# Patient Record
Sex: MALE | Race: BLACK OR AFRICAN AMERICAN | Employment: OTHER | URBAN - METROPOLITAN AREA
[De-identification: names, ages, dates, MRNs, and addresses within clinical notes are randomized per-mention and may not be internally consistent; named-entity substitution may affect disease eponyms.]

---

## 2018-06-18 PROBLEM — R93.1 ABNORMAL ECHOCARDIOGRAM: Status: ACTIVE | Noted: 2018-06-18

## 2018-06-18 PROBLEM — Z79.01 ENCOUNTER FOR CURRENT LONG-TERM USE OF ANTICOAGULANTS: Status: ACTIVE | Noted: 2018-06-18

## 2018-12-17 PROBLEM — I25.810 CORONARY ARTERY DISEASE INVOLVING CORONARY BYPASS GRAFT OF NATIVE HEART WITHOUT ANGINA PECTORIS: Chronic | Status: ACTIVE | Noted: 2018-12-17

## 2019-08-14 ENCOUNTER — HOSPITAL ENCOUNTER (OUTPATIENT)
Age: 69
Discharge: HOME | End: 2019-08-14
Payer: MEDICARE

## 2019-08-14 LAB — RUBV IGG SER QL: 88 IU/ML

## 2019-08-14 PROCEDURE — 86735 MUMPS ANTIBODY: CPT

## 2019-08-14 PROCEDURE — 86762 RUBELLA ANTIBODY: CPT

## 2019-08-14 PROCEDURE — 86481 TB AG RESPONSE T-CELL SUSP: CPT

## 2019-08-14 PROCEDURE — 86787 VARICELLA-ZOSTER ANTIBODY: CPT

## 2019-08-14 PROCEDURE — 86765 RUBEOLA ANTIBODY: CPT

## 2019-08-16 LAB
MEASLES IMMUNE (IGG): 3.86
MUV IGG SER QL: 5.48
VZV IGG SER QL IA: 3.79

## 2019-08-17 LAB — T-SPOT TB TEST: NORMAL

## 2019-10-05 ENCOUNTER — OFFICE VISIT (OUTPATIENT)
Dept: PRIMARY CARE CLINIC | Age: 69
End: 2019-10-05
Payer: MEDICARE

## 2019-10-05 VITALS
TEMPERATURE: 97.2 F | WEIGHT: 198.2 LBS | BODY MASS INDEX: 24.64 KG/M2 | DIASTOLIC BLOOD PRESSURE: 80 MMHG | HEART RATE: 69 BPM | HEIGHT: 75 IN | SYSTOLIC BLOOD PRESSURE: 131 MMHG

## 2019-10-05 DIAGNOSIS — S69.92XA INJURY OF LEFT THUMB, INITIAL ENCOUNTER: Primary | ICD-10-CM

## 2019-10-05 PROCEDURE — G8484 FLU IMMUNIZE NO ADMIN: HCPCS | Performed by: NURSE PRACTITIONER

## 2019-10-05 PROCEDURE — 3017F COLORECTAL CA SCREEN DOC REV: CPT | Performed by: NURSE PRACTITIONER

## 2019-10-05 PROCEDURE — 4040F PNEUMOC VAC/ADMIN/RCVD: CPT | Performed by: NURSE PRACTITIONER

## 2019-10-05 PROCEDURE — 1123F ACP DISCUSS/DSCN MKR DOCD: CPT | Performed by: NURSE PRACTITIONER

## 2019-10-05 PROCEDURE — G8427 DOCREV CUR MEDS BY ELIG CLIN: HCPCS | Performed by: NURSE PRACTITIONER

## 2019-10-05 PROCEDURE — G8420 CALC BMI NORM PARAMETERS: HCPCS | Performed by: NURSE PRACTITIONER

## 2019-10-05 PROCEDURE — G8599 NO ASA/ANTIPLAT THER USE RNG: HCPCS | Performed by: NURSE PRACTITIONER

## 2019-10-05 PROCEDURE — 99202 OFFICE O/P NEW SF 15 MIN: CPT | Performed by: NURSE PRACTITIONER

## 2019-10-05 PROCEDURE — 1036F TOBACCO NON-USER: CPT | Performed by: NURSE PRACTITIONER

## 2019-10-05 RX ORDER — CEPHALEXIN 500 MG/1
500 CAPSULE ORAL 3 TIMES DAILY
Qty: 21 CAPSULE | Refills: 0 | Status: SHIPPED | OUTPATIENT
Start: 2019-10-05 | End: 2019-10-12

## 2019-10-05 ASSESSMENT — ENCOUNTER SYMPTOMS
ABDOMINAL PAIN: 0
NAUSEA: 0
VOMITING: 0
SORE THROAT: 0
EYE PAIN: 0
SINUS PAIN: 0
BACK PAIN: 0
COUGH: 0
DIARRHEA: 0
SHORTNESS OF BREATH: 0

## 2019-10-05 ASSESSMENT — PATIENT HEALTH QUESTIONNAIRE - PHQ9
2. FEELING DOWN, DEPRESSED OR HOPELESS: 0
SUM OF ALL RESPONSES TO PHQ9 QUESTIONS 1 & 2: 0
SUM OF ALL RESPONSES TO PHQ QUESTIONS 1-9: 0
SUM OF ALL RESPONSES TO PHQ QUESTIONS 1-9: 0
1. LITTLE INTEREST OR PLEASURE IN DOING THINGS: 0

## 2021-01-24 ENCOUNTER — HOSPITAL ENCOUNTER (EMERGENCY)
Age: 71
Discharge: HOME | End: 2021-01-24
Attending: EMERGENCY MEDICINE
Payer: MEDICARE

## 2021-01-24 VITALS
RESPIRATION RATE: 16 BRPM | OXYGEN SATURATION: 100 % | SYSTOLIC BLOOD PRESSURE: 171 MMHG | TEMPERATURE: 98.1 F | HEART RATE: 63 BPM | DIASTOLIC BLOOD PRESSURE: 93 MMHG

## 2021-01-24 DIAGNOSIS — M62.838 TRAPEZIUS MUSCLE SPASM: Primary | ICD-10-CM

## 2021-01-24 PROCEDURE — 96372 THER/PROPH/DIAG INJ SC/IM: CPT

## 2021-01-24 PROCEDURE — 99283 EMERGENCY DEPT VISIT LOW MDM: CPT

## 2021-01-24 PROCEDURE — 6370000000 HC RX 637 (ALT 250 FOR IP): Performed by: STUDENT IN AN ORGANIZED HEALTH CARE EDUCATION/TRAINING PROGRAM

## 2021-01-24 PROCEDURE — 6360000002 HC RX W HCPCS: Performed by: STUDENT IN AN ORGANIZED HEALTH CARE EDUCATION/TRAINING PROGRAM

## 2021-01-24 RX ORDER — ACETAMINOPHEN 500 MG
500 TABLET ORAL ONCE
Status: COMPLETED | OUTPATIENT
Start: 2021-01-24 | End: 2021-01-24

## 2021-01-24 RX ORDER — LIDOCAINE 4 G/G
1 PATCH TOPICAL DAILY
Status: DISCONTINUED | OUTPATIENT
Start: 2021-01-24 | End: 2021-01-24

## 2021-01-24 RX ORDER — KETOROLAC TROMETHAMINE 30 MG/ML
30 INJECTION, SOLUTION INTRAMUSCULAR; INTRAVENOUS ONCE
Status: COMPLETED | OUTPATIENT
Start: 2021-01-24 | End: 2021-01-24

## 2021-01-24 RX ORDER — CYCLOBENZAPRINE HCL 10 MG
10 TABLET ORAL 3 TIMES DAILY PRN
Qty: 12 TABLET | Refills: 0 | Status: SHIPPED | OUTPATIENT
Start: 2021-01-24 | End: 2021-01-29

## 2021-01-24 RX ORDER — CYCLOBENZAPRINE HCL 10 MG
5 TABLET ORAL ONCE
Status: COMPLETED | OUTPATIENT
Start: 2021-01-24 | End: 2021-01-24

## 2021-01-24 RX ORDER — CYCLOBENZAPRINE HCL 10 MG
10 TABLET ORAL ONCE
Status: DISCONTINUED | OUTPATIENT
Start: 2021-01-24 | End: 2021-01-24

## 2021-01-24 RX ORDER — NAPROXEN 500 MG/1
500 TABLET ORAL 2 TIMES DAILY WITH MEALS
Qty: 10 TABLET | Refills: 0 | Status: SHIPPED | OUTPATIENT
Start: 2021-01-24 | End: 2022-10-18

## 2021-01-24 RX ORDER — LIDOCAINE 4 G/G
1 PATCH TOPICAL DAILY
Status: DISCONTINUED | OUTPATIENT
Start: 2021-01-24 | End: 2021-01-24 | Stop reason: HOSPADM

## 2021-01-24 RX ADMIN — KETOROLAC TROMETHAMINE 30 MG: 30 INJECTION, SOLUTION INTRAMUSCULAR at 04:06

## 2021-01-24 RX ADMIN — ACETAMINOPHEN 500 MG: 500 TABLET ORAL at 04:06

## 2021-01-24 RX ADMIN — CYCLOBENZAPRINE 5 MG: 10 TABLET, FILM COATED ORAL at 04:06

## 2021-01-24 ASSESSMENT — ENCOUNTER SYMPTOMS
VOMITING: 0
SHORTNESS OF BREATH: 0
ABDOMINAL PAIN: 0
NAUSEA: 0
BACK PAIN: 0

## 2021-01-24 ASSESSMENT — PAIN SCALES - GENERAL: PAINLEVEL_OUTOF10: 9

## 2021-01-24 ASSESSMENT — PAIN DESCRIPTION - LOCATION: LOCATION: NECK

## 2021-01-24 NOTE — ED PROVIDER NOTES
[facility]  ED  Emergency Department Encounter  Emergency Medicine Resident     Pt Name: Tree Burch  MRN: [[id number]]  [place] [id number]  Date of evaluation: 1/24/21  PCP:  No primary care provider on file. CHIEF COMPLAINT       Chief Complaint   Patient presents with    Neck Pain     No head pain, no fall, no numbnss/tingling       HISTORY OFPRESENT ILLNESS  (Location/Symptom, Timing/Onset, Context/Setting, Quality, Duration, Modifying Factors,Severity.)      Tree Burch is a 69 y. o.yo male who presents with neck pain. Patient here with bilateral trapezius pain started on Thursday progressively worsened, denies any traumatic injuries denies any numbness or tingling in his hands or feet, denies weakness on bilateral upper extremities. States he has no midline tenderness and is mostly his paraspinal area. Says he is unable to turn his neck. Denies headache or vision changes or focal neuro deficits. Denies fevers or chills at home, states he has never had a major infection does have a history of CAD and pacemaker in place. Denies nausea or vomiting, rash or shortness of breath or chest pain. PAST MEDICAL / SURGICAL / SOCIAL / FAMILY HISTORY      has a past medical history of Atrial fibrillation ([address] Coronary atherosclerosis of unspecified type of vessel, native or graft, Edema, Other and unspecified hyperlipidemia, Preoperative examination, unspecified, Shortness of breath, Type II or unspecified type diabetes mellitus without mention of complication, not stated as uncontrolled, and Unspecified essential hypertension. has a past surgical history that includes Coronary artery bypass graft (2005); Cardiac pacemaker placement (3/07 and 2/15 ); Achilles tendon surgery; Cholecystectomy, laparoscopic (5/15); and Bariatric Surgery (5/15 ).      Social History     Socioeconomic History    Marital status:      Spouse name: Not on file    Number of children: Not on file    Years of education: Not on file    Highest education level: Not on file   Occupational History    Not on file   Social Needs    Financial resource strain: Not on file    Food insecurity     Worry: Not on file     Inability: Not on file    Transportation needs     Medical: Not on file     Non-medical: Not on file   Tobacco Use    Smoking status: Never Smoker    Smokeless tobacco: Never Used   Substance and Sexual Activity    Alcohol use: Yes    Drug use: No    Sexual activity: Not on file   Lifestyle    Physical activity     Days per week: Not on file     Minutes per session: Not on file    Stress: Not on file   Relationships    Social connections     Talks on phone: Not on file     Gets together: Not on file     Attends Latter day service: Not on file     Active member of club or organization: Not on file     Attends meetings of clubs or organizations: Not on file     Relationship status: Not on file    Intimate partner violence     Fear of current or ex partner: Not on file     Emotionally abused: Not on file     Physically abused: Not on file     Forced sexual activity: Not on file   Other Topics Concern    Not on file   Social History Narrative    Not on file       Family History   Problem Relation Age of Onset    Heart Attack Father         Allergies:  Codeine    Home Medications:  Prior to Admission medications    Medication Sig Start Date End Date Taking?  Authorizing Provider   cyclobenzaprine (FLEXERIL) 10 MG tablet Take 1 tablet by mouth 3 times daily as needed for Muscle spasms 1/24/21 1/29/21 Yes Emanuel Breen MD   naproxen (NAPROSYN) 500 MG tablet Take 1 tablet by mouth 2 times daily (with meals) for 5 days 1/24/21 1/29/21 Yes Emanuel Breen MD   lisinopril (PRINIVIL;ZESTRIL) 10 MG tablet Take 1 tablet by mouth daily 3/9/20   Breonna De Luna MD   metoprolol succinate (TOPROL XL) 25 MG extended release tablet TAKE 1 TABLET BY MOUTH EVERY DAY 1/25/19   Breonna De Luna MD   furosemide (LASIX) 20 MG tablet Take 1 tablet by mouth daily as needed (swelling) 12/17/18   Tyshawn Allison MD   metFORMIN (GLUCOPHAGE) 1000 MG tablet Take 1,000 mg by mouth daily (with breakfast)    Historical Provider, MD   Cholecalciferol (VITAMIN D3) 2000 UNITS CAPS Take by mouth daily    Historical Provider, MD   Thiamine HCl (VITAMIN B-1) 250 MG tablet Take 250 mg by mouth daily    Historical Provider, MD   Magnesium 65 MG TABS Take by mouth    Historical Provider, MD   Multiple Vitamins-Minerals (CENTRUM SILVER) TABS Take by mouth daily    Historical Provider, MD   pioglitazone (ACTOS) 45 MG tablet Take 45 mg by mouth daily. Historical Provider, MD   atorvastatin (LIPITOR) 40 MG tablet Take 40 mg by mouth daily. Historical Provider, MD       REVIEW OFSYSTEMS    (2-9 systems for level 4, 10 or more for level 5)      Review of Systems   Constitutional: Negative for diaphoresis and fever. HENT: Negative for congestion. Eyes: Negative for visual disturbance. Respiratory: Negative for shortness of breath. Cardiovascular: Negative for chest pain. Gastrointestinal: Negative for abdominal pain, nausea and vomiting. Endocrine: Negative for polyuria. Genitourinary: Negative for dysuria. Musculoskeletal: Positive for neck pain and neck stiffness. Negative for back pain. Skin: Negative for wound. Neurological: Negative for headaches. Psychiatric/Behavioral: Negative for confusion. PHYSICAL EXAM   (up to 7 for level 4, 8 or more forlevel 5)      ED TRIAGE VITALS BP: (!) 171/93, Temp: 98.1 °F (36.7 °C), Pulse: 63, Resp: 16, SpO2: 100 %    Vitals:    01/24/21 0341   BP: (!) 171/93   Pulse: 63   Resp: 16   Temp: 98.1 °F (36.7 °C)   SpO2: 100%       Physical Exam  Constitutional:       General: He is not in acute distress. Appearance: He is well-developed. HENT:      Head: Normocephalic and atraumatic. Nose: Nose normal.   Eyes:      Pupils: Pupils are equal, round, and reactive to light. Neck:      Musculoskeletal: Neck rigidity and muscular tenderness present. Comments: Paraspinal tenderness, trapezius tenderness and spasm  Cardiovascular:      Rate and Rhythm: Normal rate and regular rhythm. Heart sounds: No murmur. Pulmonary:      Effort: Pulmonary effort is normal. No respiratory distress. Breath sounds: No stridor. No wheezing. Abdominal:      General: There is no distension. Palpations: Abdomen is soft. Tenderness: There is no abdominal tenderness. Musculoskeletal: Normal range of motion. General: Tenderness present. Arms:    Skin:     General: Skin is warm and dry. Capillary Refill: Capillary refill takes less than 2 seconds. Findings: No erythema or rash. Neurological:      Mental Status: He is alert and oriented to person, place, and time. Sensory: No sensory deficit. Deep Tendon Reflexes: Reflexes normal.   Psychiatric:         Behavior: Behavior normal.         DIFFERENTIAL  DIAGNOSIS     PLAN (LABS / IMAGING / EKG):  No orders of the defined types were placed in this encounter. MEDICATIONS ORDERED:  Orders Placed This Encounter   Medications    DISCONTD: lidocaine 4 % external patch 1 patch    DISCONTD: cyclobenzaprine (FLEXERIL) tablet 10 mg    ketorolac (TORADOL) injection 30 mg    acetaminophen (TYLENOL) tablet 500 mg    cyclobenzaprine (FLEXERIL) tablet 5 mg    lidocaine 4 % external patch 1 patch    cyclobenzaprine (FLEXERIL) 10 MG tablet     Sig: Take 1 tablet by mouth 3 times daily as needed for Muscle spasms     Dispense:  12 tablet     Refill:  0    naproxen (NAPROSYN) 500 MG tablet     Sig: Take 1 tablet by mouth 2 times daily (with meals) for 5 days     Dispense:  10 tablet     Refill:  0       DDX:     Trapezius muscle spasm, muscle spasm, tension headache,    Initial MDM/Plan: 69 y.o. male who presents with neck pain.   Patient here with bilateral trapezius pain started on Thursday [address]  [phone]  In 3 days      [facility] ED  [facility]  [phone]    As needed, If symptoms worsen      DISCHARGE MEDICATIONS:  New Prescriptions    CYCLOBENZAPRINE (FLEXERIL) 10 MG TABLET    Take 1 tablet by mouth 3 times daily as needed for Muscle spasms    NAPROXEN (NAPROSYN) 500 MG TABLET    Take 1 tablet by mouth 2 times daily (with meals) for 5 days       Fernando Hernandez MD  Emergency Medicine Resident    (Please note that portions of this note were completed with a voice recognition program.Efforts were made to edit the dictations but occasionally words are mis-transcribed.)       Fernando Hernandez MD  Resident  01/24/21 0143

## 2021-01-24 NOTE — ED NOTES
Pt presents to ED with complaints of bilateral neck pain. Pt states pain started on Thursday, denies fall, denies numbness/tingling, denies headache, denies changes in vision and hearing. Pt states he fell asleep Wednesday night and woke up Thursday morning with pain. Pt states taking tylenol at home for pain. Pt alert and oriented x4, respirations even and unlabored, and in no signs of distress. Pt talking in clear and complete sentences and acting appropriate for age. Pt's VSS with hypertension. Will continue to monitor.      Kendall Charles RN  01/24/21 4351

## 2021-01-25 NOTE — ED PROVIDER NOTES
[facility]  ED  Emergency Department  Faculty Attestation       I performed a history and physical examination of the patient and discussed management with the resident. I reviewed the residents note and agree with the documented findings including all diagnostic interpretations and plan of care. Any areas of disagreement are noted on the chart. I was personally present for the key portions of any procedures. I have documented in the chart those procedures where I was not present during the key portions. I have reviewed the emergency nurses triage note. I agree with the chief complaint, past medical history, past surgical history, allergies, medications, social and family history as documented unless otherwise noted below. Documentation of the HPI, Physical Exam and Medical Decision Making performed by scribcandice is based on my personal performance of the HPI, PE and MDM. For Physician Assistant/ Nurse Practitioner cases/documentation I have personally evaluated this patient and have completed at least one if not all key elements of the E/M (history, physical exam, and MDM). Additional findings are as noted. Pertinent Comments     Primary Care Physician: No primary care provider on file. ED Triage Vitals [01/24/21 0341]   BP Temp Temp src Pulse Resp SpO2 Height Weight   (!) 171/93 98.1 °F (36.7 °C) -- 63 16 100 % -- --        History/Physical: This is a 69 y.o. male who presents to the Emergency Department with complaint of neck pain since Thursday. Bilateral, initially started one sided and now is both sides. No numbness or weakness. No falls. No h/o IVDU    On exam patient is well appearing in no acute distress. No midline neck tenderness. There is bilateral parapsinal tenderness through the trapezius but normal ROM (slightly slowed due to pain). Normal flexion and rotation. No pain with axial loading.   Normal strength and sensation of bilateral upper extremities     MDM/Plan: Neck pain bilateral  No midline tenderness and no red flag symptoms. Normal neuro exam.  No clinical sings of spinal cord compression or bony injury thereofre no imaging at this time. Likely muscle spasm. Symptomatic treatment. F/u PCP  Return if any concerns arise.        CRITICAL CARE: None     Abel Mcallister MD  Attending Emergency Physician         Abel Mcallister MD  01/25/21 9156

## 2022-09-06 LAB
CHOLESTEROL/HDL RATIO: 2.8
CHOLESTEROL: 174 MG/DL
GLUCOSE BLD-MCNC: 224 MG/DL (ref 70–99)
HDLC SERPL-MCNC: 62 MG/DL
LDL CHOLESTEROL: 97 MG/DL (ref 0–130)
PATIENT FASTING?: ABNORMAL
TRIGL SERPL-MCNC: 74 MG/DL

## 2022-10-18 ENCOUNTER — HOSPITAL ENCOUNTER (OUTPATIENT)
Dept: PREADMISSION TESTING | Age: 72
Discharge: HOME | End: 2022-10-22

## 2022-10-18 ENCOUNTER — HOSPITAL ENCOUNTER (OUTPATIENT)
Age: 72
Setting detail: SPECIMEN
Discharge: HOME | End: 2022-10-18

## 2022-10-18 VITALS
DIASTOLIC BLOOD PRESSURE: 67 MMHG | SYSTOLIC BLOOD PRESSURE: 141 MMHG | HEIGHT: 76 IN | OXYGEN SATURATION: 98 % | BODY MASS INDEX: 26.67 KG/M2 | RESPIRATION RATE: 16 BRPM | HEART RATE: 82 BPM | WEIGHT: 219 LBS

## 2022-10-18 DIAGNOSIS — Z01.818 PRE-OP TESTING: Primary | ICD-10-CM

## 2022-10-18 DIAGNOSIS — Z01.818 PRE-OP TESTING: ICD-10-CM

## 2022-10-18 LAB
ABSOLUTE EOS #: 0.21 K/UL (ref 0–0.44)
ABSOLUTE IMMATURE GRANULOCYTE: <0.03 K/UL (ref 0–0.3)
ABSOLUTE LYMPH #: 2.16 K/UL (ref 1.1–3.7)
ABSOLUTE MONO #: 0.44 K/UL (ref 0.1–1.2)
BASOPHILS # BLD: 1 % (ref 0–2)
BASOPHILS ABSOLUTE: 0.06 K/UL (ref 0–0.2)
EOSINOPHILS RELATIVE PERCENT: 4 % (ref 1–4)
HCT VFR BLD CALC: 38.1 % (ref 40.7–50.3)
HEMOGLOBIN: 12.2 G/DL (ref 13–17)
IMMATURE GRANULOCYTES: 0 %
LYMPHOCYTES # BLD: 42 % (ref 24–43)
MCH RBC QN AUTO: 31 PG (ref 25.2–33.5)
MCHC RBC AUTO-ENTMCNC: 32 G/DL (ref 28.4–34.8)
MCV RBC AUTO: 96.9 FL (ref 82.6–102.9)
MONOCYTES # BLD: 9 % (ref 3–12)
NRBC AUTOMATED: 0 PER 100 WBC
PDW BLD-RTO: 13.2 % (ref 11.8–14.4)
PLATELET # BLD: ABNORMAL K/UL (ref 138–453)
PLATELET, FLUORESCENCE: 144 K/UL (ref 138–453)
PLATELET, IMMATURE FRACTION: 7.5 % (ref 1.1–10.3)
RBC # BLD: 3.93 M/UL (ref 4.21–5.77)
SEG NEUTROPHILS: 44 % (ref 36–65)
SEGMENTED NEUTROPHILS ABSOLUTE COUNT: 2.25 K/UL (ref 1.5–8.1)
WBC # BLD: 5.1 K/UL (ref 3.5–11.3)

## 2022-10-18 RX ORDER — NITROGLYCERIN 0.4 MG/1
TABLET SUBLINGUAL
COMMUNITY
Start: 2022-10-10

## 2022-10-18 NOTE — DISCHARGE INSTRUCTIONS
Preoperative Instructions:    Stop eating solid foods at midnight the night prior to your surgery. Stop drinking clear liquids at midnight the night prior to your surgery. Arrive at the surgery center (3rd entrance) on ____[id number]___________ by __1200-1230_____________. Please stop any blood thinning medications as directed by your surgeon or prescribing physician. Failure to stop certain medications may interfere with your scheduled surgery. These may include: Aspirin, Coumadin, Plavix, NSAIDS (Motrin, Aleve, Advil, Mobic, Celebrex), Eliquis, Pradaxa, Xarelto, Fish oil, and herbal supplements. Hold Multivitamin , Vitamin E., Aspirin as directed    You may continue the rest of your medications through the night before surgery unless instructed otherwise. Day of surgery please take only the following medication(s) with a small sip of water: Atorvastatin, Lisinopril, Metoprolol      Please  shower with provided CHG soap the night before and the morning of surgery. Reminders:  -If you are going home the day of your procedure, you will need a family member or friend to stay during the procedure and drive you home after your procedure. Your  must be 25years of age or older and able to sign off on your discharge instructions.    -If you are going home the same day of your surgery, someone must remain with you for the first 24 hours after your surgery if you receive sedation or anesthesia.      -Please do not wear any jewelery or body piercing the day of surgery

## 2022-10-19 LAB
ANION GAP SERPL CALCULATED.3IONS-SCNC: 10 MMOL/L (ref 9–17)
BUN BLDV-MCNC: 16 MG/DL (ref 8–23)
CALCIUM SERPL-MCNC: 8.8 MG/DL (ref 8.6–10.4)
CHLORIDE BLD-SCNC: 107 MMOL/L (ref 98–107)
CO2: 25 MMOL/L (ref 20–31)
CREAT SERPL-MCNC: 1.19 MG/DL (ref 0.7–1.2)
GFR SERPL CREATININE-BSD FRML MDRD: >60 ML/MIN/1.73M2
GLUCOSE BLD-MCNC: 116 MG/DL (ref 70–99)
POTASSIUM SERPL-SCNC: 4.5 MMOL/L (ref 3.7–5.3)
SODIUM BLD-SCNC: 142 MMOL/L (ref 135–144)

## 2022-10-25 ENCOUNTER — ANESTHESIA EVENT (OUTPATIENT)
Dept: OPERATING ROOM | Age: 72
End: 2022-10-25

## 2022-10-26 ENCOUNTER — ANESTHESIA (OUTPATIENT)
Dept: OPERATING ROOM | Age: 72
End: 2022-10-26

## 2022-10-26 ENCOUNTER — HOSPITAL ENCOUNTER (OUTPATIENT)
Age: 72
Setting detail: OUTPATIENT SURGERY
Discharge: HOME | End: 2022-10-26
Attending: STUDENT IN AN ORGANIZED HEALTH CARE EDUCATION/TRAINING PROGRAM | Admitting: STUDENT IN AN ORGANIZED HEALTH CARE EDUCATION/TRAINING PROGRAM
Payer: MEDICARE

## 2022-10-26 ENCOUNTER — HOSPITAL ENCOUNTER (OUTPATIENT)
Dept: GENERAL RADIOLOGY | Age: 72
Discharge: HOME | End: 2022-10-28

## 2022-10-26 VITALS
DIASTOLIC BLOOD PRESSURE: 80 MMHG | WEIGHT: 214 LBS | HEART RATE: 61 BPM | SYSTOLIC BLOOD PRESSURE: 148 MMHG | BODY MASS INDEX: 26.61 KG/M2 | OXYGEN SATURATION: 97 % | RESPIRATION RATE: 14 BRPM | HEIGHT: 75 IN | TEMPERATURE: 96.5 F

## 2022-10-26 DIAGNOSIS — G89.18 POST-OP PAIN: Primary | ICD-10-CM

## 2022-10-26 DIAGNOSIS — R52 PAIN: ICD-10-CM

## 2022-10-26 LAB — GLUCOSE BLD-MCNC: 88 MG/DL (ref 75–110)

## 2022-10-26 PROCEDURE — 3700000000 HC ANESTHESIA ATTENDED CARE: Performed by: STUDENT IN AN ORGANIZED HEALTH CARE EDUCATION/TRAINING PROGRAM

## 2022-10-26 PROCEDURE — 7100000011 HC PHASE II RECOVERY - ADDTL 15 MIN: Performed by: STUDENT IN AN ORGANIZED HEALTH CARE EDUCATION/TRAINING PROGRAM

## 2022-10-26 PROCEDURE — 82947 ASSAY GLUCOSE BLOOD QUANT: CPT

## 2022-10-26 PROCEDURE — 3209999900 FLUORO FOR SURGICAL PROCEDURES

## 2022-10-26 PROCEDURE — 7100000001 HC PACU RECOVERY - ADDTL 15 MIN: Performed by: STUDENT IN AN ORGANIZED HEALTH CARE EDUCATION/TRAINING PROGRAM

## 2022-10-26 PROCEDURE — 2580000003 HC RX 258: Performed by: ANESTHESIOLOGY

## 2022-10-26 PROCEDURE — 2720000010 HC SURG SUPPLY STERILE: Performed by: STUDENT IN AN ORGANIZED HEALTH CARE EDUCATION/TRAINING PROGRAM

## 2022-10-26 PROCEDURE — 6370000000 HC RX 637 (ALT 250 FOR IP)

## 2022-10-26 PROCEDURE — 3700000001 HC ADD 15 MINUTES (ANESTHESIA): Performed by: STUDENT IN AN ORGANIZED HEALTH CARE EDUCATION/TRAINING PROGRAM

## 2022-10-26 PROCEDURE — 2709999900 HC NON-CHARGEABLE SUPPLY: Performed by: STUDENT IN AN ORGANIZED HEALTH CARE EDUCATION/TRAINING PROGRAM

## 2022-10-26 PROCEDURE — 7100000010 HC PHASE II RECOVERY - FIRST 15 MIN: Performed by: STUDENT IN AN ORGANIZED HEALTH CARE EDUCATION/TRAINING PROGRAM

## 2022-10-26 PROCEDURE — 2500000003 HC RX 250 WO HCPCS: Performed by: STUDENT IN AN ORGANIZED HEALTH CARE EDUCATION/TRAINING PROGRAM

## 2022-10-26 PROCEDURE — C1713 ANCHOR/SCREW BN/BN,TIS/BN: HCPCS | Performed by: STUDENT IN AN ORGANIZED HEALTH CARE EDUCATION/TRAINING PROGRAM

## 2022-10-26 PROCEDURE — C1769 GUIDE WIRE: HCPCS | Performed by: STUDENT IN AN ORGANIZED HEALTH CARE EDUCATION/TRAINING PROGRAM

## 2022-10-26 PROCEDURE — 7100000000 HC PACU RECOVERY - FIRST 15 MIN: Performed by: STUDENT IN AN ORGANIZED HEALTH CARE EDUCATION/TRAINING PROGRAM

## 2022-10-26 PROCEDURE — 3600000004 HC SURGERY LEVEL 4 BASE: Performed by: STUDENT IN AN ORGANIZED HEALTH CARE EDUCATION/TRAINING PROGRAM

## 2022-10-26 PROCEDURE — 3600000014 HC SURGERY LEVEL 4 ADDTL 15MIN: Performed by: STUDENT IN AN ORGANIZED HEALTH CARE EDUCATION/TRAINING PROGRAM

## 2022-10-26 DEVICE — SCREW BNE L30MM DIA2.5MM MIC FT ANK TI SELF DRL ST CANN: Type: IMPLANTABLE DEVICE | Site: FOOT | Status: FUNCTIONAL

## 2022-10-26 RX ORDER — OXYCODONE HYDROCHLORIDE AND ACETAMINOPHEN 5; 325 MG/1; MG/1
1 TABLET ORAL
Status: COMPLETED | OUTPATIENT
Start: 2022-10-26 | End: 2022-10-26

## 2022-10-26 RX ORDER — MEPERIDINE HYDROCHLORIDE 50 MG/ML
12.5 INJECTION INTRAMUSCULAR; INTRAVENOUS; SUBCUTANEOUS EVERY 5 MIN PRN
Status: DISCONTINUED | OUTPATIENT
Start: 2022-10-26 | End: 2022-10-26 | Stop reason: HOSPADM

## 2022-10-26 RX ORDER — LIDOCAINE HYDROCHLORIDE 10 MG/ML
1 INJECTION, SOLUTION INFILTRATION; PERINEURAL
Status: DISCONTINUED | OUTPATIENT
Start: 2022-10-26 | End: 2022-10-26 | Stop reason: HOSPADM

## 2022-10-26 RX ORDER — SODIUM CHLORIDE 0.9 % (FLUSH) 0.9 %
5-40 SYRINGE (ML) INJECTION EVERY 12 HOURS SCHEDULED
Status: DISCONTINUED | OUTPATIENT
Start: 2022-10-26 | End: 2022-10-26 | Stop reason: HOSPADM

## 2022-10-26 RX ORDER — MIDAZOLAM HYDROCHLORIDE 2 MG/2ML
2 INJECTION, SOLUTION INTRAMUSCULAR; INTRAVENOUS
Status: DISCONTINUED | OUTPATIENT
Start: 2022-10-26 | End: 2022-10-26 | Stop reason: HOSPADM

## 2022-10-26 RX ORDER — OXYCODONE HYDROCHLORIDE AND ACETAMINOPHEN 5; 325 MG/1; MG/1
2 TABLET ORAL
Status: DISCONTINUED | OUTPATIENT
Start: 2022-10-26 | End: 2022-10-26 | Stop reason: HOSPADM

## 2022-10-26 RX ORDER — ONDANSETRON 2 MG/ML
4 INJECTION INTRAMUSCULAR; INTRAVENOUS
Status: DISCONTINUED | OUTPATIENT
Start: 2022-10-26 | End: 2022-10-26 | Stop reason: HOSPADM

## 2022-10-26 RX ORDER — OXYCODONE HYDROCHLORIDE AND ACETAMINOPHEN 5; 325 MG/1; MG/1
1 TABLET ORAL EVERY 6 HOURS PRN
Qty: 20 TABLET | Refills: 0 | Status: SHIPPED | OUTPATIENT
Start: 2022-10-26 | End: 2022-10-31

## 2022-10-26 RX ORDER — SODIUM CHLORIDE 0.9 % (FLUSH) 0.9 %
5-40 SYRINGE (ML) INJECTION PRN
Status: DISCONTINUED | OUTPATIENT
Start: 2022-10-26 | End: 2022-10-26 | Stop reason: HOSPADM

## 2022-10-26 RX ORDER — BUPIVACAINE HYDROCHLORIDE 5 MG/ML
INJECTION, SOLUTION EPIDURAL; INTRACAUDAL
Status: DISCONTINUED
Start: 2022-10-26 | End: 2022-10-26 | Stop reason: HOSPADM

## 2022-10-26 RX ORDER — MORPHINE SULFATE 2 MG/ML
2 INJECTION, SOLUTION INTRAMUSCULAR; INTRAVENOUS EVERY 5 MIN PRN
Status: DISCONTINUED | OUTPATIENT
Start: 2022-10-26 | End: 2022-10-26 | Stop reason: HOSPADM

## 2022-10-26 RX ORDER — PROMETHAZINE HYDROCHLORIDE 25 MG/ML
6.25 INJECTION, SOLUTION INTRAMUSCULAR; INTRAVENOUS EVERY 5 MIN PRN
Status: DISCONTINUED | OUTPATIENT
Start: 2022-10-26 | End: 2022-10-26 | Stop reason: HOSPADM

## 2022-10-26 RX ORDER — IPRATROPIUM BROMIDE AND ALBUTEROL SULFATE 2.5; .5 MG/3ML; MG/3ML
1 SOLUTION RESPIRATORY (INHALATION)
Status: DISCONTINUED | OUTPATIENT
Start: 2022-10-26 | End: 2022-10-26 | Stop reason: HOSPADM

## 2022-10-26 RX ORDER — FENTANYL CITRATE 50 UG/ML
INJECTION, SOLUTION INTRAMUSCULAR; INTRAVENOUS PRN
Status: DISCONTINUED | OUTPATIENT
Start: 2022-10-26 | End: 2022-10-26 | Stop reason: SDUPTHER

## 2022-10-26 RX ORDER — AMOXICILLIN AND CLAVULANATE POTASSIUM 875; 125 MG/1; MG/1
1 TABLET, FILM COATED ORAL 2 TIMES DAILY
Qty: 10 TABLET | Refills: 0 | Status: SHIPPED | OUTPATIENT
Start: 2022-10-26 | End: 2022-10-31

## 2022-10-26 RX ORDER — LIDOCAINE HYDROCHLORIDE 10 MG/ML
INJECTION, SOLUTION EPIDURAL; INFILTRATION; INTRACAUDAL; PERINEURAL
Status: DISCONTINUED
Start: 2022-10-26 | End: 2022-10-26 | Stop reason: HOSPADM

## 2022-10-26 RX ORDER — SODIUM CHLORIDE 9 MG/ML
25 INJECTION, SOLUTION INTRAVENOUS PRN
Status: DISCONTINUED | OUTPATIENT
Start: 2022-10-26 | End: 2022-10-26 | Stop reason: HOSPADM

## 2022-10-26 RX ORDER — LIDOCAINE HYDROCHLORIDE 10 MG/ML
INJECTION, SOLUTION INFILTRATION; PERINEURAL PRN
Status: DISCONTINUED | OUTPATIENT
Start: 2022-10-26 | End: 2022-10-26 | Stop reason: SDUPTHER

## 2022-10-26 RX ORDER — CEFAZOLIN 2 G/1
INJECTION, POWDER, FOR SOLUTION INTRAMUSCULAR; INTRAVENOUS
Status: DISCONTINUED
Start: 2022-10-26 | End: 2022-10-26 | Stop reason: HOSPADM

## 2022-10-26 RX ORDER — DIPHENHYDRAMINE HYDROCHLORIDE 50 MG/ML
12.5 INJECTION INTRAMUSCULAR; INTRAVENOUS
Status: DISCONTINUED | OUTPATIENT
Start: 2022-10-26 | End: 2022-10-26 | Stop reason: HOSPADM

## 2022-10-26 RX ORDER — SODIUM CHLORIDE, SODIUM LACTATE, POTASSIUM CHLORIDE, CALCIUM CHLORIDE 600; 310; 30; 20 MG/100ML; MG/100ML; MG/100ML; MG/100ML
INJECTION, SOLUTION INTRAVENOUS CONTINUOUS
Status: DISCONTINUED | OUTPATIENT
Start: 2022-10-26 | End: 2022-10-26 | Stop reason: HOSPADM

## 2022-10-26 RX ORDER — ONDANSETRON 2 MG/ML
INJECTION INTRAMUSCULAR; INTRAVENOUS PRN
Status: DISCONTINUED | OUTPATIENT
Start: 2022-10-26 | End: 2022-10-26 | Stop reason: SDUPTHER

## 2022-10-26 RX ORDER — GLYCOPYRROLATE 0.2 MG/ML
0.4 INJECTION INTRAMUSCULAR; INTRAVENOUS ONCE
Status: DISCONTINUED | OUTPATIENT
Start: 2022-10-26 | End: 2022-10-26 | Stop reason: HOSPADM

## 2022-10-26 RX ORDER — DEXAMETHASONE SODIUM PHOSPHATE 10 MG/ML
INJECTION, SOLUTION INTRAMUSCULAR; INTRAVENOUS PRN
Status: DISCONTINUED | OUTPATIENT
Start: 2022-10-26 | End: 2022-10-26 | Stop reason: SDUPTHER

## 2022-10-26 RX ORDER — KETOROLAC TROMETHAMINE 30 MG/ML
INJECTION, SOLUTION INTRAMUSCULAR; INTRAVENOUS PRN
Status: DISCONTINUED | OUTPATIENT
Start: 2022-10-26 | End: 2022-10-26 | Stop reason: SDUPTHER

## 2022-10-26 RX ORDER — AMOXICILLIN AND CLAVULANATE POTASSIUM 875; 125 MG/1; MG/1
1 TABLET, FILM COATED ORAL 2 TIMES DAILY
Qty: 14 TABLET | Refills: 0 | Status: SHIPPED | OUTPATIENT
Start: 2022-10-26 | End: 2022-10-26 | Stop reason: SDUPTHER

## 2022-10-26 RX ORDER — LABETALOL HYDROCHLORIDE 5 MG/ML
10 INJECTION, SOLUTION INTRAVENOUS
Status: DISCONTINUED | OUTPATIENT
Start: 2022-10-26 | End: 2022-10-26 | Stop reason: HOSPADM

## 2022-10-26 RX ORDER — SODIUM CHLORIDE 9 MG/ML
INJECTION, SOLUTION INTRAVENOUS PRN
Status: DISCONTINUED | OUTPATIENT
Start: 2022-10-26 | End: 2022-10-26 | Stop reason: HOSPADM

## 2022-10-26 RX ORDER — OXYCODONE HYDROCHLORIDE AND ACETAMINOPHEN 5; 325 MG/1; MG/1
TABLET ORAL
Status: COMPLETED
Start: 2022-10-26 | End: 2022-10-26

## 2022-10-26 RX ORDER — SODIUM CHLORIDE, SODIUM LACTATE, POTASSIUM CHLORIDE, CALCIUM CHLORIDE 600; 310; 30; 20 MG/100ML; MG/100ML; MG/100ML; MG/100ML
INJECTION, SOLUTION INTRAVENOUS CONTINUOUS PRN
Status: DISCONTINUED | OUTPATIENT
Start: 2022-10-26 | End: 2022-10-26 | Stop reason: SDUPTHER

## 2022-10-26 RX ORDER — PROPOFOL 10 MG/ML
INJECTION, EMULSION INTRAVENOUS PRN
Status: DISCONTINUED | OUTPATIENT
Start: 2022-10-26 | End: 2022-10-26 | Stop reason: SDUPTHER

## 2022-10-26 RX ORDER — MIDAZOLAM HYDROCHLORIDE 1 MG/ML
INJECTION INTRAMUSCULAR; INTRAVENOUS PRN
Status: DISCONTINUED | OUTPATIENT
Start: 2022-10-26 | End: 2022-10-26 | Stop reason: SDUPTHER

## 2022-10-26 RX ORDER — CEFAZOLIN SODIUM 2 G/50ML
SOLUTION INTRAVENOUS PRN
Status: DISCONTINUED | OUTPATIENT
Start: 2022-10-26 | End: 2022-10-26 | Stop reason: SDUPTHER

## 2022-10-26 RX ADMIN — DEXAMETHASONE SODIUM PHOSPHATE 8 MG: 10 INJECTION, SOLUTION INTRAMUSCULAR; INTRAVENOUS at 17:05

## 2022-10-26 RX ADMIN — MIDAZOLAM HYDROCHLORIDE 2 MG: 1 INJECTION INTRAMUSCULAR; INTRAVENOUS at 17:00

## 2022-10-26 RX ADMIN — OXYCODONE HYDROCHLORIDE AND ACETAMINOPHEN 1 TABLET: 5; 325 TABLET ORAL at 18:31

## 2022-10-26 RX ADMIN — LIDOCAINE HYDROCHLORIDE 40 MG: 10 INJECTION, SOLUTION INFILTRATION; PERINEURAL at 17:05

## 2022-10-26 RX ADMIN — SODIUM CHLORIDE, SODIUM LACTATE, POTASSIUM CHLORIDE, CALCIUM CHLORIDE: 600; 310; 30; 20 INJECTION, SOLUTION INTRAVENOUS at 17:00

## 2022-10-26 RX ADMIN — KETOROLAC TROMETHAMINE 30 MG: 30 INJECTION, SOLUTION INTRAMUSCULAR; INTRAVENOUS at 17:50

## 2022-10-26 RX ADMIN — ONDANSETRON 4 MG: 2 INJECTION INTRAMUSCULAR; INTRAVENOUS at 17:50

## 2022-10-26 RX ADMIN — SODIUM CHLORIDE: 9 INJECTION, SOLUTION INTRAVENOUS at 13:20

## 2022-10-26 RX ADMIN — CEFAZOLIN SODIUM 2000 MG: 2 SOLUTION INTRAVENOUS at 17:00

## 2022-10-26 RX ADMIN — FENTANYL CITRATE 100 MCG: 50 INJECTION, SOLUTION INTRAMUSCULAR; INTRAVENOUS at 17:00

## 2022-10-26 RX ADMIN — PROPOFOL 130 MG: 10 INJECTION, EMULSION INTRAVENOUS at 17:05

## 2022-10-26 ASSESSMENT — PAIN DESCRIPTION - DESCRIPTORS
DESCRIPTORS: SORE

## 2022-10-26 ASSESSMENT — PAIN DESCRIPTION - LOCATION
LOCATION: FOOT

## 2022-10-26 ASSESSMENT — PAIN - FUNCTIONAL ASSESSMENT: PAIN_FUNCTIONAL_ASSESSMENT: 0-10

## 2022-10-26 ASSESSMENT — PAIN DESCRIPTION - ORIENTATION
ORIENTATION: LEFT
ORIENTATION: LEFT

## 2022-10-26 ASSESSMENT — PAIN SCALES - GENERAL
PAINLEVEL_OUTOF10: 5
PAINLEVEL_OUTOF10: 4
PAINLEVEL_OUTOF10: 5

## 2022-10-26 ASSESSMENT — PAIN DESCRIPTION - PAIN TYPE: TYPE: SURGICAL PAIN

## 2022-10-26 NOTE — DISCHARGE INSTRUCTIONS
Podiatric Post Operative Instructions: You have had a surgical procedure on your left foot. Fluids and Diet:  Begin with clear liquids, broth, dry toast, and crackers. If not nauseated then resume your regular pre-operative diet when you are ready    Medications: Take your prescriptions as directed  You are receiving new prescriptions for Percocet and Augmentin. If your pain is not severe then you may take the non-prescription medication that you normally take for aches and pains  You may resume your regularly scheduled medications (unless otherwise directed)  If any side effects or adverse reactions occur, discontinue the medication and contact your doctor. Review the patient drug information that is provided before you take any medication    Ambulation and Activity:  You are advised to go directly home from the hospital  You may put weight on the operated foot. You should wear the surgical shoe at all times when awake. Avoid stairs if possible. Do not lift or move heavy objects  Do not drive until cleared by your physician    Bandage and Wound Care Instructions:  Keep bandage clean and dry  Do not shower or bathe the operative extremity  Do not remove the bandage (unless otherwise directed)   Do not attempt to put anything between the cast or dressing and your skin, some itching is normal.    Ice and Elevation:  Elevate operative extremity as much as possible to reduce swelling and discomfort. Elevate with 2 pillows at or above the level of the heart for the first 72 hours. Ice:  [facility] dispensed insulated ice bag over the bandage 20 minutes of every hour while awake for the first 72 hours. You may ice behind the knee as well. Special Instructions: Call your doctor immediately if you develop any of the following. Fever over 100.4 degrees Fahrenheit by mouth - take your temperature daily until your first follow up visit.   Pain not relieved by medication ordered  Swelling, increased redness, warmth, or hardness around operative area. Numb, tingling or cold toes. Toe(s) become white or bluish  Bandage becomes wet, soiled, or blood soaked (small amount of bleeding may be normal)  Increased or progressive drainage from surgical area. Follow up instructions: You will need to follow up with Dr. Zita Alejandre DPM.  Call when you get home to schedule or confirm your appointment. Call your Podiatrist office if you have any questions or concerns. Activity   You have had anesthesia today  Do not drive, operate heavy equipment, consume alcoholic beverages, or make any important decisions  for 24 hours   If you are taking pain medication: Do not drive or consume alcohol. Take your time changing positions today. You may feel light headed or dizzy if you move too quickly. Continue your home medications as ordered by your physician. Diet   You can eat your normal diet when you feel well. You should start off with bland foods like chicken soup, toast, or yogurt. Then advance as tolerated. Drink plenty of fluids (unless your doctor tells you not to). Your urine should be very lightly colored without a strong odor.

## 2022-10-26 NOTE — ANESTHESIA PRE PROCEDURE
Department of Anesthesiology  Preprocedure Note       Name:  Darby Hoyt   Age:  67 y.o.  :  1950                                          MRN:  [id number]         Date:  10/26/2022      Surgeon: Paula Vasquez):  Feliciano Isaac DPM    Procedure: Procedure(s):  LEFT 2ND TOE HAMMER REPAIR WITH FLEXOR TENOTOMY    Medications prior to admission:   Prior to Admission medications    Medication Sig Start Date End Date Taking? Authorizing Provider   nitroGLYCERIN (NITROSTAT) 0.4 MG SL tablet 1 under the tongue as needed for angina, may repeat q5mins for up three doses 10/10/22   Historical Provider, MD   lisinopril (PRINIVIL;ZESTRIL) 10 MG tablet Take 1 tablet by mouth daily 3/9/20   Bc Durham MD   metoprolol succinate (TOPROL XL) 25 MG extended release tablet TAKE 1 TABLET BY MOUTH EVERY DAY 19   Bc Durham MD   furosemide (LASIX) 20 MG tablet Take 1 tablet by mouth daily as needed (swelling) 18   Bc Durham MD   metFORMIN (GLUCOPHAGE) 1000 MG tablet Take 1,000 mg by mouth daily (with breakfast)    Historical Provider, MD   Cholecalciferol (VITAMIN D3) 2000 UNITS CAPS Take by mouth daily  Patient not taking: No sig reported    Historical Provider, MD   Thiamine HCl (VITAMIN B-1) 250 MG tablet Take 250 mg by mouth daily  Patient not taking: No sig reported    Historical Provider, MD   Magnesium 65 MG TABS Take by mouth  Patient not taking: No sig reported    Historical Provider, MD   Multiple Vitamins-Minerals (CENTRUM SILVER) TABS Take by mouth daily    Historical Provider, MD   pioglitazone (ACTOS) 45 MG tablet Take 45 mg by mouth daily. Patient not taking: No sig reported    Historical Provider, MD   atorvastatin (LIPITOR) 40 MG tablet Take 40 mg by mouth daily.     Historical Provider, MD       Current medications:    Current Facility-Administered Medications   Medication Dose Route Frequency Provider Last Rate Last Admin    lidocaine 1 % injection 1 mL  1 mL IntraDERmal Once PRN Raz Lynnie Lesch, MD        lactated ringers infusion   IntraVENous Continuous Murali Mcknight MD        sodium chloride flush 0.9 % injection 5-40 mL  5-40 mL IntraVENous 2 times per day Murali Mcknight MD        sodium chloride flush 0.9 % injection 5-40 mL  5-40 mL IntraVENous PRN Murali Mcknight MD        0.9 % sodium chloride infusion   IntraVENous PRN Murali Mcknight  mL/hr at 10/26/22 1320 New Bag at 10/26/22 1320    ceFAZolin (ANCEF) 2 g injection                Allergies:     Allergies   Allergen Reactions    Codeine Nausea And Vomiting       Problem List:    Patient Active Problem List   Diagnosis Code    CAD (coronary artery disease) I25.10    Pacemaker Z95.0    Paroxysmal A-fib (HCC) I48.0    Hyperlipidemia E78.5    HTN (hypertension) I10    Pulmonary hypertension ([facility] [address] I27.20    Non-rheumatic tricuspid valve insufficiency I36.1    Non-rheumatic mitral regurgitation I34.0    Encounter for current long-term use of anticoagulants Z79.01    Abnormal echocardiogram R93.1    Coronary artery disease involving coronary bypass graft of native heart without angina pectoris I25.810       Past Medical History:        Diagnosis Date    Atrial fibrillation ([facility] [address]     Coronary atherosclerosis of unspecified type of vessel, native or graft     denies MI or stents    Edema     Other and unspecified hyperlipidemia     Preoperative examination, unspecified     Shortness of breath     Type II or unspecified type diabetes mellitus without mention of complication, not stated as uncontrolled     Unspecified essential hypertension        Past Surgical History:        Procedure Laterality Date    ACHILLES TENDON SURGERY      BARIATRIC SURGERY  05/01/2015    gastric sleeve     CARDIAC PACEMAKER PLACEMENT  3/07 and 2/15     [employer]     CHOLECYSTECTOMY, LAPAROSCOPIC  05/01/2015    COLONOSCOPY      CORONARY ARTERY BYPASS GRAFT  01/01/2005    LIMA to LAD, SVG to D, SVG to PDA, RA to OM2  PACEMAKER PLACEMENT      [employer] DDDR       Social History:    Social History     Tobacco Use    Smoking status: Never    Smokeless tobacco: Never   Substance Use Topics    Alcohol use: Yes     Comment: rarely                                Counseling given: Not Answered      Vital Signs (Current):   Vitals:    10/26/22 1254   BP: (!) 154/77   Pulse: 68   Resp: 21   Temp: (!) 96.3 °F (35.7 °C)   TempSrc: Infrared   SpO2: 100%   Weight: 214 lb (97.1 kg)   Height: 6' 3\" (1.905 m)                                              BP Readings from Last 3 Encounters:   10/26/22 (!) 154/77   10/18/22 (!) 141/67   01/24/21 (!) 171/93       NPO Status: Time of last liquid consumption: 2100                        Time of last solid consumption: 2100                        Date of last liquid consumption: 10/25/22                        Date of last solid food consumption: 10/25/22    BMI:   Wt Readings from Last 3 Encounters:   10/26/22 214 lb (97.1 kg)   10/18/22 219 lb (99.3 kg)   03/09/20 196 lb (88.9 kg)     Body mass index is 26.75 kg/m².     CBC:   Lab Results   Component Value Date/Time    WBC 5.1 10/18/2022 03:35 PM    RBC 3.93 10/18/2022 03:35 PM    HGB 12.2 10/18/2022 03:35 PM    HCT 38.1 10/18/2022 03:35 PM    MCV 96.9 10/18/2022 03:35 PM    RDW 13.2 10/18/2022 03:35 PM    PLT See Reflexed IPF Result 10/18/2022 03:35 PM       CMP:   Lab Results   Component Value Date/Time     10/18/2022 03:35 PM    K 4.5 10/18/2022 03:35 PM     10/18/2022 03:35 PM    CO2 25 10/18/2022 03:35 PM    BUN 16 10/18/2022 03:35 PM    CREATININE 1.19 10/18/2022 03:35 PM    LABGLOM >60 10/18/2022 03:35 PM    GLUCOSE 116 10/18/2022 03:35 PM    CALCIUM 8.8 10/18/2022 03:35 PM       POC Tests:   Recent Labs     10/26/22  1301   POCGLU 88       Coags: No results found for: PROTIME, INR, APTT    HCG (If Applicable): No results found for: PREGTESTUR, PREGSERUM, HCG, HCGQUANT     ABGs: No results found for: PHART, PO2ART, MQH9XVJ, ILY5LUN, BEART, L3SWJQEH     Type & Screen (If Applicable):  No results found for: LABABO, LABRH    Drug/Infectious Status (If Applicable):  No results found for: HIV, HEPCAB    COVID-19 Screening (If Applicable): No results found for: COVID19        Anesthesia Evaluation  Patient summary reviewed and Nursing notes reviewed  Airway: Mallampati: II  TM distance: >3 FB   Neck ROM: full  Mouth opening: > = 3 FB   Dental:      Comment: -MISSING SOME UPPER AND LOWER TEETH    Pulmonary:Negative Pulmonary ROS and normal exam                               Cardiovascular:    (+) hypertension:, pacemaker: pacemaker, CAD:,                ROS comment: -PACEMAKER PLACED FOR AFIB - LAST CHECKED ONE MONTH AGO  -PULM HTN  -S/P CABG 2005     Neuro/Psych:   Negative Neuro/Psych ROS              GI/Hepatic/Renal: Neg GI/Hepatic/Renal ROS            Endo/Other:    (+) Diabetes, . ROS comment: -NPO AFTER MIDNIGHT  -ALLERGIES - CODEINE Abdominal:             Vascular: negative vascular ROS. Other Findings:           Anesthesia Plan      general     ASA 3     (LMA)  Induction: intravenous. MIPS: Postoperative opioids intended and Prophylactic antiemetics administered. Anesthetic plan and risks discussed with patient. Plan discussed with CRNA.     Attending anesthesiologist reviewed and agrees with Wolf Vásquez MD   10/26/2022

## 2022-10-26 NOTE — H&P
History and Physical  [facility]        PATIENT NAME: Josiane Henry OF BIRTH: 1950  GENDER: male     Procedure Date: 10/26/2022 12:23 PM     Attending Provider: Pro Covarrubias DPM    Chief Complaint: Left foot hammertoe deformity    Procedure Scheduled: Left 3rd digit hammertoe repair with flexor tenotomy    History of present Illness: The patient is a 67 y.o. male  who presents to pre-op area prior to scheduled for above mentioned procedure. Pt recommended to undergo above mentioned procedures at earliest possible convenience. Pt denies changes to his medical history since he was last seen in office. Denies current nausea, vomiting, chest pain, SOB, fever, and chills. Consent form signed and reviewed. Any/all additional questions/concerns were addressed and consent form updated. Pt elected to pursue the above mentioned procedures as scheduled for today. Past Medical History:  has a past medical history of Atrial fibrillation ([address] Coronary atherosclerosis of unspecified type of vessel, native or graft, Edema, Other and unspecified hyperlipidemia, Preoperative examination, unspecified, Shortness of breath, Type II or unspecified type diabetes mellitus without mention of complication, not stated as uncontrolled, and Unspecified essential hypertension. Past Surgical History:   Past Surgical History:   Procedure Laterality Date    ACHILLES TENDON SURGERY      BARIATRIC SURGERY  05/01/2015    gastric sleeve     CARDIAC PACEMAKER PLACEMENT  3/07 and 2/15     [employer]     CHOLECYSTECTOMY, LAPAROSCOPIC  05/01/2015    COLONOSCOPY      CORONARY ARTERY BYPASS GRAFT  01/01/2005    LIMA to LAD, SVG to D, SVG to PDA, RA to OM2    PACEMAKER PLACEMENT      medtronic DDDR       Social History:  reports that he has never smoked. He has never used smokeless tobacco. He reports current alcohol use. He reports that he does not use drugs.     Family History: family history includes Heart Attack in his father.     Review of Systems:   Negative except as listed above    Allergies: Codeine    Current Meds:  Current Facility-Administered Medications:     lidocaine 1 % injection 1 mL, 1 mL, IntraDERmal, Once PRN, Tierra Winters MD    lactated ringers infusion, , IntraVENous, Continuous, Raz Isaac MD    sodium chloride flush 0.9 % injection 5-40 mL, 5-40 mL, IntraVENous, 2 times per day, Tierra Winters MD    sodium chloride flush 0.9 % injection 5-40 mL, 5-40 mL, IntraVENous, PRN, Tierra Winters MD    0.9 % sodium chloride infusion, , IntraVENous, PRN, Tierra Winters MD, Last Rate: 100 mL/hr at 10/26/22 1320, New Bag at 10/26/22 1320    ceFAZolin (ANCEF) 2 g injection, , , ,     bupivacaine (PF) (MARCAINE) 0.5 % injection, , , ,     lidocaine PF 1 % injection, , , ,     Vital Signs:  Vitals:    10/26/22 1254   BP: (!) 154/77   Pulse: 68   Resp: 21   Temp: (!) 96.3 °F (35.7 °C)   SpO2: 100%       Physical Exam:  Vital signs and Nurse's note reviewed  Gen:  A&Ox3, NAD  HEENT: NCAT, PERRLA, EOMI, no scleral icterus, oral mucosa moist  Neck: Supple, trachea midline  Chest: Symmetric rise with inhalation, no evidence of trauma  CVS: Regular rate and rhythm, no murmurs, no rubs or gallops   Resp: Good bilateral air entry, clear to auscultation b/l, no wheeze or rhonchi  Abd: soft, non-tender, non-distended  Ext: No clubbing, cyanosis, edema, peripheral pulses 2+ Rad/Fem/DP/PT  CNS: Moves all extremities, no gross focal motor deficits  Skin: No erythema or ulcerations     Labs:   Lab Results   Component Value Date/Time    WBC 5.1 10/18/2022 03:35 PM    HGB 12.2 10/18/2022 03:35 PM    HCT 38.1 10/18/2022 03:35 PM    MCV 96.9 10/18/2022 03:35 PM    PLT See Reflexed IPF Result 10/18/2022 03:35 PM     Lab Results   Component Value Date/Time     10/18/2022 03:35 PM    K 4.5 10/18/2022 03:35 PM     10/18/2022 03:35 PM    CO2 25 10/18/2022 03:35 PM    BUN 16 10/18/2022 03:35 PM CREATININE 1.19 10/18/2022 03:35 PM    GLUCOSE 116 10/18/2022 03:35 PM    CALCIUM 8.8 10/18/2022 03:35 PM     No results found for: ALKPHOS, ALT, AST, PROT, BILITOT, BILIDIR, LABALBU  No results found for: LACTA  No results found for: AMYLASE  No results found for: LIPASE  No results found for: INR          Assessment:  Active Problems:    * No active hospital problems. *  Resolved Problems:    * No resolved hospital problems.  *    Left foot hammertoe deformity, 3rd toe  Left foot pain      Plan:  Proceed w/ above mentioned procedures as scheduled for today  Pt to be DC'd home post procedure  WB as tolerated  Follow up with Ana Gomez DPM    Electronically signed by Anjelica Agosto DPM  on 10/26/2022 at 5:01 PM

## 2022-10-26 NOTE — BRIEF OP NOTE
Brief Postoperative Note      Patient: Jeanine Kent  YOB: 1950  MRN: [id number]    Date of Procedure: 10/26/2022    Pre-Op Diagnosis:     Hammer toe of left foot [M20.42]  Pain of toe of left foot [M79.675]    Post-Op Diagnosis: Same       Procedures:  Arthrodesis of 3rd digit, left foot  Flexor tenotomy, 3rd digit, left foot    Surgeon(s):  Brandon Flores DPM    Assistant:  Feliciano Campos DPM    Anesthesia: General    Hemostasis: Direct pressure    Estimated Blood Loss (mL): Minimal    Materials: None    Injectables: 10 ml of a 1:1 racemic mixture of 0.25% marcaine with epinephrine and 1% lidocaine plain    Complications: None    Specimens:   * No specimens in log *    Implants:  Implant Name Type Inv. Item Serial No.  Lot No. LRB No. Used Action   SCREW BNE L30MM DIA2. 5MM MARTITA FT ANK TI SELF DRL ST [employer] - [id number]  SCREW BNE L30MM DIA2. 5MM MARTITA FT ANK TI SELF DRL ST [employer]  [employer]  Left 1 Implanted         Drains: * No LDAs found *    Findings: Hammertoe contracture noted to 3rd digit, left foot. See full op report for details.     Electronically signed by Feliciano Campos DPM on 10/26/2022 at 6:02 PM

## 2022-10-26 NOTE — ANESTHESIA POSTPROCEDURE EVALUATION
Department of Anesthesiology  Postprocedure Note    Patient: Brianna Estimmonika  MRN: [de-identified]  [place]: 1950  Date of evaluation: 10/26/2022      Procedure Summary     Date: 10/26/22 Room / Location: [address]) [facility]    Anesthesia Start: 1700 Anesthesia Stop: 2358    Procedure: LEFT 3RD TOE HAMMER REPAIR WITH FLEXOR TENOTOMY (Left: Toes) Diagnosis:       Hammer toe of left foot      Pain of toe of left foot      (Hammer toe of left foot [M20.42])      (Pain of toe of left foot [M79.675])    Surgeons: Jia Fisher DPM Responsible Provider: Sravan Marks MD    Anesthesia Type: general ASA Status: 3          Anesthesia Type: No value filed.     Avelina Phase I:      Avelina Phase II:        Anesthesia Post Evaluation    Patient location during evaluation: PACU  Patient participation: complete - patient participated  Level of consciousness: awake and alert  Airway patency: patent  Nausea & Vomiting: no nausea and no vomiting  Complications: no  Cardiovascular status: hemodynamically stable  Respiratory status: room air and spontaneous ventilation  Hydration status: euvolemic  Multimodal analgesia pain management approach

## 2022-10-28 NOTE — OP NOTE
the flexor tendon for several years. In conjunction with the deformity he also has a painful preulcerative lesion to the distal aspect of the digit. The patient has seen several physicians over the years and failed multiple conservative treatment modalities including shoe gear modification, padding, paring of the lesion, anti-inflammatory therapy. Pain has progressed to the point of limiting ambulation, difficulty with shoe gear, and negatively affecting activities of daily living. Surgical intervention is recommended to restore anatomic alignment, ease ambulation, in hopes of improving pain. The patient agrees to undergo surgical correction at this time. All risks and benefits were discussed in extensive detail with the patient. no guarantees were given or implied. Consent obtained and placed in the chart. COVID negative. PROCEDURE IN DETAIL: Under mild sedation the patient was transported from pre-op to the operating room and placed on the operating table in the supine position with a safety strap across the lap. A well-padded pneumatic tourniquet was applied to the left thigh. After adequate sedation by the Anesthesia team, a block of 10cc of a 1:1 mixture of 1% lidocaine plain and 0.25% marcaine with epinephrine was injected at the level of the midfoot. The foot was then prepped, scrubbed, and draped in the usual aseptic fashion. A timeout was performed confirming the correct patient, correct surgical site, correct procedure, preoperative antibiotics, everyone in the room was in agreement. Tourniquet was not flayed at this time. Attention was directed to the third digit where there is noted to be a significant hammertoe deformity at the level of the PIPJ, preulcerative lesion to the distal aspect of the digit, and flexor contracture. Attention was directed to the plantar of the third digit at the level of the PIPJ. An incision was made using a #15 blade.  The incision was deepened to the level of muscle and tendon. The FDL tendon was identified. A tenotomy of the FDL tendon was performed using a #15 blade. The contracture of the 3rd digit was noted to be reduced post procedure. Next an incision was made over the dorsal lateral aspect of the third digit. The incision was deepened to level capsule utilizing sharp dissection. The PIPJ was identified and a transverse incision was made over the capsule. The extensor tendon, capsule and periosteum were reflected. Next a joint resection bur was utilized to resect the head of the proximal phalanx and the base of the middle phalanx removing all cartilage. Edges of the arthrodesis site were temporarily approximated to ensure rectus alignment of the third digit. At this time 2.5mm screw was inserted from the distal phalanx to the middle phalanx and into the proximal phalanx. This did require ancillary incision at the distal aspect of the digit. Care was taken not to violate the second MPJ. There was noted to be excellent compression across the arthrodesis site. The surgical sites were irrigated with copious amounts of saline. The surgical sites were then closed utilizing 3-0 Prolene. Dressings consisting of Adaptic, gauze 4 x 4's, Kerlix, ACE were then applied. The patient tolerated the above procedure and anesthesia well without complications. The patient was transported from the operating room to the PACU with vital signs stable and vascular status intact to the foot. Postoperative plan: Patient will receive antibiotics and pain medication. He is to be weightbearing to the heel of the left foot with use of surgical shoe. He will follow-up in my office within 1 week.     Jermaine Sanchez DPM on 10/28/2022 at 7:19 AM  The [address] [place] [address] Surgery  [facility]ate, [employer]    This note was generated with the assistance of a speech recognition program. While intending to generate a timely document that accurately reflects the content of the visit, no guarantee can be provided that every grammatical or spelling mistake has been or will be identified or corrected. In such instances, actual meaning can be extrapolated by context. Thank you for your understanding.

## 2025-01-14 ENCOUNTER — HOSPITAL ENCOUNTER (EMERGENCY)
Age: 75
Discharge: HOME | End: 2025-01-14
Attending: EMERGENCY MEDICINE
Payer: MEDICARE

## 2025-01-14 ENCOUNTER — APPOINTMENT (OUTPATIENT)
Dept: GENERAL RADIOLOGY | Age: 75
End: 2025-01-14
Payer: MEDICARE

## 2025-01-14 VITALS
TEMPERATURE: 97.5 F | DIASTOLIC BLOOD PRESSURE: 61 MMHG | RESPIRATION RATE: 17 BRPM | SYSTOLIC BLOOD PRESSURE: 158 MMHG | HEART RATE: 66 BPM | OXYGEN SATURATION: 100 %

## 2025-01-14 DIAGNOSIS — R42 LIGHTHEADEDNESS: Primary | ICD-10-CM

## 2025-01-14 DIAGNOSIS — N17.9 AKI (ACUTE KIDNEY INJURY) (HCC): ICD-10-CM

## 2025-01-14 LAB
ANION GAP SERPL CALCULATED.3IONS-SCNC: 12 MMOL/L (ref 9–16)
ANION GAP SERPL CALCULATED.3IONS-SCNC: 9 MMOL/L (ref 9–16)
B PARAP IS1001 DNA NPH QL NAA+NON-PROBE: NOT DETECTED
B PERT DNA SPEC QL NAA+PROBE: NOT DETECTED
BASOPHILS # BLD: 0.04 K/UL (ref 0–0.2)
BASOPHILS NFR BLD: 1 % (ref 0–2)
BUN SERPL-MCNC: 35 MG/DL (ref 8–23)
BUN SERPL-MCNC: 39 MG/DL (ref 8–23)
C PNEUM DNA NPH QL NAA+NON-PROBE: NOT DETECTED
CALCIUM SERPL-MCNC: 8.2 MG/DL (ref 8.6–10.4)
CALCIUM SERPL-MCNC: 9.2 MG/DL (ref 8.6–10.4)
CHLORIDE SERPL-SCNC: 101 MMOL/L (ref 98–107)
CHLORIDE SERPL-SCNC: 108 MMOL/L (ref 98–107)
CO2 SERPL-SCNC: 19 MMOL/L (ref 20–31)
CO2 SERPL-SCNC: 21 MMOL/L (ref 20–31)
CREAT SERPL-MCNC: 1.5 MG/DL (ref 0.7–1.2)
CREAT SERPL-MCNC: 1.8 MG/DL (ref 0.7–1.2)
EKG ATRIAL RATE: 65 BPM
EKG P-R INTERVAL: 186 MS
EKG Q-T INTERVAL: 448 MS
EKG QRS DURATION: 156 MS
EKG QTC CALCULATION (BAZETT): 465 MS
EKG R AXIS: -96 DEGREES
EKG T AXIS: 82 DEGREES
EKG VENTRICULAR RATE: 65 BPM
EOSINOPHIL # BLD: 0.15 K/UL (ref 0–0.44)
EOSINOPHILS RELATIVE PERCENT: 3 % (ref 1–4)
ERYTHROCYTE [DISTWIDTH] IN BLOOD BY AUTOMATED COUNT: 13.4 % (ref 11.8–14.4)
FLUAV RNA NPH QL NAA+NON-PROBE: NOT DETECTED
FLUBV RNA NPH QL NAA+NON-PROBE: NOT DETECTED
GFR, ESTIMATED: 39 ML/MIN/1.73M2
GFR, ESTIMATED: 48 ML/MIN/1.73M2
GLUCOSE SERPL-MCNC: 254 MG/DL (ref 74–99)
GLUCOSE SERPL-MCNC: 309 MG/DL (ref 74–99)
HADV DNA NPH QL NAA+NON-PROBE: NOT DETECTED
HCOV 229E RNA NPH QL NAA+NON-PROBE: NOT DETECTED
HCOV HKU1 RNA NPH QL NAA+NON-PROBE: NOT DETECTED
HCOV NL63 RNA NPH QL NAA+NON-PROBE: NOT DETECTED
HCOV OC43 RNA NPH QL NAA+NON-PROBE: NOT DETECTED
HCT VFR BLD AUTO: 41.1 % (ref 40.7–50.3)
HGB BLD-MCNC: 13.4 G/DL (ref 13–17)
HMPV RNA NPH QL NAA+NON-PROBE: NOT DETECTED
HPIV1 RNA NPH QL NAA+NON-PROBE: NOT DETECTED
HPIV2 RNA NPH QL NAA+NON-PROBE: NOT DETECTED
HPIV3 RNA NPH QL NAA+NON-PROBE: NOT DETECTED
HPIV4 RNA NPH QL NAA+NON-PROBE: NOT DETECTED
IMM GRANULOCYTES # BLD AUTO: <0.03 K/UL (ref 0–0.3)
IMM GRANULOCYTES NFR BLD: 0 %
LYMPHOCYTES NFR BLD: 0.76 K/UL (ref 1.1–3.7)
LYMPHOCYTES RELATIVE PERCENT: 15 % (ref 24–43)
M PNEUMO DNA NPH QL NAA+NON-PROBE: NOT DETECTED
MCH RBC QN AUTO: 30 PG (ref 25.2–33.5)
MCHC RBC AUTO-ENTMCNC: 32.6 G/DL (ref 28.4–34.8)
MCV RBC AUTO: 91.9 FL (ref 82.6–102.9)
MONOCYTES NFR BLD: 0.58 K/UL (ref 0.1–1.2)
MONOCYTES NFR BLD: 11 % (ref 3–12)
NEUTROPHILS NFR BLD: 70 % (ref 36–65)
NEUTS SEG NFR BLD: 3.58 K/UL (ref 1.5–8.1)
NRBC BLD-RTO: 0 PER 100 WBC
PLATELET # BLD AUTO: 254 K/UL (ref 138–453)
PMV BLD AUTO: 10.6 FL (ref 8.1–13.5)
POTASSIUM SERPL-SCNC: 5.2 MMOL/L (ref 3.7–5.3)
POTASSIUM SERPL-SCNC: 5.6 MMOL/L (ref 3.7–5.3)
RBC # BLD AUTO: 4.47 M/UL (ref 4.21–5.77)
RSV RNA NPH QL NAA+NON-PROBE: NOT DETECTED
RV+EV RNA NPH QL NAA+NON-PROBE: NOT DETECTED
SARS-COV-2 RNA NPH QL NAA+NON-PROBE: NOT DETECTED
SODIUM SERPL-SCNC: 134 MMOL/L (ref 136–145)
SODIUM SERPL-SCNC: 136 MMOL/L (ref 136–145)
SPECIMEN DESCRIPTION: NORMAL
TROPONIN I SERPL HS-MCNC: 14 NG/L (ref 0–22)
TROPONIN I SERPL HS-MCNC: 16 NG/L (ref 0–22)
WBC OTHER # BLD: 5.1 K/UL (ref 3.5–11.3)

## 2025-01-14 PROCEDURE — 2580000003 HC RX 258: Performed by: STUDENT IN AN ORGANIZED HEALTH CARE EDUCATION/TRAINING PROGRAM

## 2025-01-14 PROCEDURE — 71045 X-RAY EXAM CHEST 1 VIEW: CPT

## 2025-01-14 PROCEDURE — 80048 BASIC METABOLIC PNL TOTAL CA: CPT

## 2025-01-14 PROCEDURE — 93005 ELECTROCARDIOGRAM TRACING: CPT | Performed by: STUDENT IN AN ORGANIZED HEALTH CARE EDUCATION/TRAINING PROGRAM

## 2025-01-14 PROCEDURE — 99285 EMERGENCY DEPT VISIT HI MDM: CPT

## 2025-01-14 PROCEDURE — 0202U NFCT DS 22 TRGT SARS-COV-2: CPT

## 2025-01-14 PROCEDURE — 85025 COMPLETE CBC W/AUTO DIFF WBC: CPT

## 2025-01-14 PROCEDURE — 84484 ASSAY OF TROPONIN QUANT: CPT

## 2025-01-14 RX ORDER — 0.9 % SODIUM CHLORIDE 0.9 %
1000 INTRAVENOUS SOLUTION INTRAVENOUS ONCE
Status: COMPLETED | OUTPATIENT
Start: 2025-01-14 | End: 2025-01-14

## 2025-01-14 RX ADMIN — SODIUM CHLORIDE 1000 ML: 9 INJECTION, SOLUTION INTRAVENOUS at 04:33

## 2025-01-14 ASSESSMENT — ENCOUNTER SYMPTOMS
COUGH: 0
VOMITING: 0
SHORTNESS OF BREATH: 0
NAUSEA: 0
ABDOMINAL PAIN: 0

## 2025-01-14 NOTE — DISCHARGE INSTR - COC
Continuity of Care Form    Patient Name: Cayetano Feliz   :  1950  MRN:  [id number]    Admit date:  2025  Discharge date:  ***    Code Status Order: No Order   Advance Directives:   Advance Care Flowsheet Documentation             Admitting Physician:  No admitting provider for patient encounter.  PCP: Branden Yeboah MD    Discharging Nurse: ***  Discharging Hospital Unit/Room#:   Discharging Unit Phone Number: ***    Emergency Contact:   Extended Emergency Contact Information  Primary Emergency Contact: Myranda Feliz  Address: [address] [place] [address] [address]           [place] [address] [facility]  Home Phone: [phone]  Work Phone: [phone]  Mobile Phone: [phone]  Relation: Spouse  Hearing or visual needs: None  Other needs: None  Preferred language: English   needed? No    Past Surgical History:  Past Surgical History:   Procedure Laterality Date    ACHILLES TENDON SURGERY      BARIATRIC SURGERY  2015    gastric sleeve     CARDIAC PACEMAKER PLACEMENT  3/07 and 2/15     [employer]     CHOLECYSTECTOMY, LAPAROSCOPIC  2015    COLONOSCOPY      CORONARY ARTERY BYPASS GRAFT  2005    LIMA to LAD, SVG to D, SVG to PDA, RA to OM2    HAMMER TOE SURGERY Left 10/26/2022    LEFT 2ND TOE HAMMER REPAIR WITH FLEXOR TENOTOMY    HAMMER TOE SURGERY Left 10/26/2022    LEFT 3RD TOE HAMMER REPAIR WITH FLEXOR TENOTOMY performed by Tacho Duggan DPM at [facility] OR    PACEMAKER PLACEMENT      medtronic DDDR       Immunization History:   Immunization History   Administered Date(s) Administered    COVID-19, MODERNA Bivalent, (age 12y+), IM, 50 mcg/0.5 mL 10/21/2022    COVID-19, PFIZER, (age 12y+), IM, 30mcg/0.3mL 2024    Influenza Virus Vaccine 10/27/2019       Active Problems:  Patient Active Problem List   Diagnosis Code    CAD (coronary artery disease) I25.10    Pacemaker Z95.0    Paroxysmal A-fib (HCC) I48.0    Hyperlipidemia E78.5

## 2025-01-14 NOTE — DISCHARGE INSTRUCTIONS
You are seen emergency department for an episode of lightheadedness/dizziness.  Your blood pressure was mildly elevated on arrival.  Other vital signs were stable.  Your lab work did show that your kidney numbers went up, this improved with IV fluids.  You could have been dehydrated which could have caused your lightheadedness and your kidney numbers to go up.    EKG showed no acute changes.  Troponins which are your heart enzymes were not elevated.  The rest of your lab work and chest x-ray were within normal limits.    You did not have any further episodes of lightheadedness during your stay.    We did offer to have you stay in the hospital to see our cardiologist tomorrow.  However, you felt safe going home and following up with your cardiologist as you have an appointment this coming Thursday.    Please go to your appointment this Thursday for follow-up.    Please go to your follow-up appointment with your PCP next Monday.    Please return to the emergency department if you have recurrent episodes or if you develop any fevers, vomiting, chest pain, shortness of breath, or any other concerning symptoms.

## 2025-01-14 NOTE — ED NOTES
[employer] Pacemaker interrogated  
Pt ambulate to restroom using steady gait  
Pt presents to the ED via triage with c/o dizziness, chills  Pt states he began with symptoms of dizziness, chills and nausea at around 0130  Pt states he drank water, took 1 Nitro and had a BM in which helped relieved the symptoms  Pt has extensive cardiac hx; hx of triple bypass and pacemaker  Pt denies CP at time of symptoms  Pt concerned for episode of hypotension during symptoms  Pt connected to full monitor, VSS, IV established  Call light in reach  
Report given to ARIELLE Cortes  All questions answered  
The following labs labeled with pt sticker and tubed to lab:     [] Blue     [] Lavender   [] on ice  [] Green/yellow  [] Green/black [] on ice  [] Yellow  [] Red  [] Pink      [] COVID-19 swab    [] Rapid  [] PCR  [] Flu Swab  [] Strep Swab  [x] Viral Panel     [] Urine Sample  [] Pelvic Cultures  [] Blood Cultures   [] Wound Cultures     
The following labs labeled with pt sticker and tubed to lab:     [] Blue     [] Lavender   [] on ice  [x] Green/yellow  [] Green/black [] on ice  [] Yellow  [] Red  [] Pink      [] COVID-19 swab    [] Rapid  [] PCR  [] Flu Swab  [] Strep Swab  [] Peds Viral Panel     [] Urine Sample  [] Pelvic Cultures  [] Blood Cultures   [] Wound Cultures     
The following labs labeled with pt sticker and tubed to lab:     [] Blue     [] Lavender   [] on ice  [x] Green/yellow  [] Green/black [] on ice  [] Yellow  [] Red  [] Pink      [] COVID-19 swab    [] Rapid  [] PCR  [] Flu Swab  [] Strep Swab  [] Peds Viral Panel     [] Urine Sample  [] Pelvic Cultures  [] Blood Cultures   [] Wound Cultures     
Breath sounds clear and equal bilaterally.

## 2025-01-14 NOTE — ED PROVIDER NOTES
[facility] EMERGENCY DEPARTMENT  Emergency Department Encounter  Emergency Medicine Resident     Pt Name:Cayetano Feliz  MRN: [id number]  Birthdate 1950  Date of evaluation: 1/14/25  PCP:  Branden Yeboah MD  Note Started: 4:11 AM EST      CHIEF COMPLAINT       Chief Complaint   Patient presents with    Dizziness       HISTORY OF PRESENT ILLNESS  (Location/Symptom, Timing/Onset, Context/Setting, Quality, Duration, Modifying Factors, Severity.)      Cayetano Feliz is a 74 y.o. male who presents with complaint of dizziness and chills.  States this started while he was working around 8031-4254.  Took a sublingual nitroglycerin, drink some water, and had a bowel movement.  States he is feeling significantly improved and back to baseline but wanted to be evaluated given his extensive medical history.  Reports history of quadruple bypass as well as complete heart block with pacemaker placement.  Denies having any chest pain, potation's, recent fever, shortness of breath, cough, abdominal pain, nausea, vomiting.  States he did have 1 episode of similar symptoms yesterday, states he has been eating but not drinking as much as usual.    PAST MEDICAL / SURGICAL / SOCIAL / FAMILY HISTORY      has a past medical history of Atrial fibrillation (HCC), Coronary atherosclerosis of unspecified type of vessel, native or graft, Edema, Other and unspecified hyperlipidemia, Preoperative examination, unspecified, Shortness of breath, Type II or unspecified type diabetes mellitus without mention of complication, not stated as uncontrolled, and Unspecified essential hypertension.       has a past surgical history that includes Coronary artery bypass graft (01/01/2005); Cardiac pacemaker placement (3/07 and 2/15 ); Achilles tendon surgery; Cholecystectomy, laparoscopic (05/01/2015); Bariatric Surgery (05/01/2015); pacemaker placement; Colonoscopy; Hammer toe surgery (Left, 10/26/2022); and Hammer toe surgery (Left, 
 [facility]  FACULTY HANDOFF       Handoff taken on the following patient from prior Attending Physician: Dr. Lowe  Pt Name: Cayetano Feliz  PCP:  Branden Yeboah MD  7:43 AM EST    Attestation  I was available and discussed any additional care issues that arose and coordinated the management plans with the resident(s) caring for the patient during my duty period. Any areas of disagreement with resident's documentation of care or procedures are noted on the chart. I was personally present for the key portions of any/all procedures during my duty period. I have documented in the chart those procedures where I was not present during the key portions.         CHIEF COMPLAINT       Chief Complaint   Patient presents with    Dizziness         CURRENT MEDICATIONS     Previous Medications  Previous Medications    ATORVASTATIN (LIPITOR) 40 MG TABLET    Take 40 mg by mouth daily.    CHOLECALCIFEROL (VITAMIN D3) 2000 UNITS CAPS    Take by mouth daily    FUROSEMIDE (LASIX) 20 MG TABLET    Take 1 tablet by mouth daily as needed (swelling)    LISINOPRIL (PRINIVIL;ZESTRIL) 10 MG TABLET    Take 1 tablet by mouth daily    MAGNESIUM 65 MG TABS    Take by mouth    METFORMIN (GLUCOPHAGE) 1000 MG TABLET    Take 1,000 mg by mouth daily (with breakfast)    METOPROLOL SUCCINATE (TOPROL XL) 25 MG EXTENDED RELEASE TABLET    TAKE 1 TABLET BY MOUTH EVERY DAY    MULTIPLE VITAMINS-MINERALS (CENTRUM SILVER) TABS    Take by mouth daily    NITROGLYCERIN (NITROSTAT) 0.4 MG SL TABLET    1 under the tongue as needed for angina, may repeat q5mins for up three doses    PIOGLITAZONE (ACTOS) 45 MG TABLET    Take 45 mg by mouth daily.    THIAMINE HCL (VITAMIN B-1) 250 MG TABLET    Take 250 mg by mouth daily       Encounter Medications  Orders Placed This Encounter   Medications    sodium chloride 0.9 % bolus 1,000 mL       ALLERGIES     is allergic to codeine.      RECENT VITALS:   Temp: 97.5 °F (36.4 °C),  Pulse: 66, 
NC/AT. Dry mucus membranes.  Normal EOM with no nystagmus.  Heart sounds regular and lungs clear to auscultation bilaterally. Abdomen is soft and nontender. No edema of the legs.  Alert and oriented x4. No pronator drift. Normal  strength.  Sensation intact throughout.   Medical Decision Making  Dizziness and chills that have now resolved.  Similar episode yesterday.   Exam shows mild dehydration but no focal neuro deficits  Extensive cardiac history  Will get basic labs, EKG, CXR   Liter of IV fluids  Re-evaluate    Amount and/or Complexity of Data Reviewed  Labs: ordered.  Radiology: ordered.  ECG/medicine tests: ordered.    Risk  Prescription drug management.     EKG Interpretation    Interpreted by emergency department physician    Clinical Impression: AV dual paced rhythm    Fernanda Lowe MD      Critical Care: None     Fernanda Lowe MD  Attending Emergency Physician         Fernanda Lowe MD  01/14/25 6487    
present. No pleural effusion or pneumothorax.  The bony structures are unremarkable.     Minimal atelectatic change seen within the lung base on the left with no evidence of acute parenchymal disease.       RECENT VITALS:     Temp: 97.5 °F (36.4 °C),  Pulse: 66, Respirations: 17, BP: (!) 158/61, SpO2: 100 %    This patient is a 74 y.o. Male with acute onset dizziness/lightheadedness and chills. Took his home Nitro and had a BM and felt better. Asymptomatic since being here.  Significant cardiac history. Mild KEN, receiving fluids. Trop 16 with repeat pending. RPP given sick contacts. Dispo.    ED Course as of 01/14/25 0742   Tue Jan 14, 2025   0457 CBC with Auto Differential(!):    WBC 5.1   RBC 4.47   Hemoglobin Quant 13.4   Hematocrit 41.1   MCV 91.9   MCH 30.0   MCHC 32.6   RDW 13.4   Platelet Count 254   MPV 10.6   NRBC Automated 0.0   Neutrophils % 70(!)   Lymphocyte % 15(!)   Monocytes % 11   Eosinophils % 3   Basophils % 1   Immature Granulocytes % 0   Neutrophils Absolute 3.58   Lymphocytes Absolute 0.76(!)   Monocytes Absolute 0.58   Eosinophils Absolute 0.15   Basophils Absolute 0.04   Immature Granulocytes Absolute <0.03  CBC without acute concerns. [CH]   0502 XR CHEST PORTABLE  \"Minimal atelectatic change seen within the lung base on the left with no  evidence of acute parenchymal disease.\" [CH]   0510 Troponin, High Sensitivity: 16  Troponin 16, no prior levels to compare to, will trend. [CH]   0511 Basic Metabolic Panel w/ Reflex to MG(!):    Sodium 134(!)   Potassium 5.6(!)   Chloride 101   CARBON DIOXIDE 21   Anion Gap 12   Glucose 309(!)   BUN,BUNPL 39(!)   Creatinine 1.8(!)   Est, Glom Filt Rate 39(!)   Calcium 9.2  Creatinine 1.8, most recent level on file 1.3.  KEN present.  Glucose 309 with pseudohyponatremia present which corrects when glucose taken into account.  Potassium 5.6 but significantly hemolyzed per lab report. [CH]   0519 After discussion with patient given his occupation and

## 2025-04-07 ENCOUNTER — OFFICE VISIT (OUTPATIENT)
Age: 75
End: 2025-04-07

## 2025-04-07 VITALS
SYSTOLIC BLOOD PRESSURE: 125 MMHG | BODY MASS INDEX: 28.23 KG/M2 | TEMPERATURE: 97.5 F | OXYGEN SATURATION: 99 % | HEIGHT: 74 IN | WEIGHT: 220 LBS | HEART RATE: 64 BPM | DIASTOLIC BLOOD PRESSURE: 67 MMHG

## 2025-04-07 DIAGNOSIS — H00.011 HORDEOLUM EXTERNUM OF RIGHT UPPER EYELID: Primary | ICD-10-CM

## 2025-04-07 DIAGNOSIS — H65.91 MIDDLE EAR EFFUSION, RIGHT: ICD-10-CM

## 2025-04-07 RX ORDER — ASPIRIN 81 MG/1
1 TABLET, CHEWABLE ORAL
COMMUNITY

## 2025-04-07 RX ORDER — ERYTHROMYCIN 5 MG/G
OINTMENT OPHTHALMIC
Qty: 3.5 G | Refills: 0 | Status: SHIPPED | OUTPATIENT
Start: 2025-04-07 | End: 2025-04-17

## 2025-04-07 RX ORDER — FLUTICASONE PROPIONATE 50 MCG
1 SPRAY, SUSPENSION (ML) NASAL DAILY
Qty: 32 G | Refills: 1 | Status: SHIPPED | OUTPATIENT
Start: 2025-04-07

## 2025-04-07 RX ORDER — GLIPIZIDE 5 MG/1
5 TABLET ORAL
COMMUNITY
Start: 2024-03-07

## 2025-04-07 ASSESSMENT — ENCOUNTER SYMPTOMS
COUGH: 0
SORE THROAT: 0
ABDOMINAL PAIN: 0
EYE PAIN: 1
SHORTNESS OF BREATH: 0

## 2025-04-07 NOTE — PROGRESS NOTES
General: Abdomen is flat. Bowel sounds are normal.      Palpations: Abdomen is soft.   Musculoskeletal:         General: Normal range of motion.   Skin:     General: Skin is warm and dry.   Neurological:      General: No focal deficit present.      Mental Status: He is alert and oriented to person, place, and time.   Psychiatric:         Mood and Affect: Mood normal.         Behavior: Behavior normal.           RESULTS  No results found for this visit on 04/07/25.     ASSESSMENT/PLAN:    ICD-10-CM    1. Hordeolum externum of right upper eyelid  H00.011 erythromycin (ROMYCIN) 5 MG/GM ophthalmic ointment      2. Middle ear effusion, right  H65.91 fluticasone (FLONASE) 50 MCG/ACT nasal spray             I consider the discharge disposition reasonable. I have discussed the diagnosis and risks with the patient and we agree with discharging home.  Follow up in 7 days with PCP if symptoms persist or if symptoms worsen.  An electronic signature was used to authenticate this note.    --MARK Martinez - CNP

## (undated) DEVICE — STANDARD HYPODERMIC NEEDLE,POLYPROPYLENE HUB: Brand: MONOJECT

## (undated) DEVICE — BIT DRL DIA2MM STR CANN FOR 2.5MM MIC COMPR FULL THRD SCR

## (undated) DEVICE — BURR AR300B002

## (undated) DEVICE — STRAP,POSITIONING,KNEE/BODY,FOAM,4X60": Brand: MEDLINE

## (undated) DEVICE — GUIDEWIRE ORTH DIA0.034IN W/ TRCR TIP LSR MRK DISP FOR MIC

## (undated) DEVICE — GLOVE ORTHO 8   MSG9480

## (undated) DEVICE — MHPB HAND AND FOOT PACK: Brand: MEDLINE INDUSTRIES, INC.

## (undated) DEVICE — DRESSING PETRO W3XL3IN OIL EMUL N ADH GZ KNIT IMPREG CELOS

## (undated) DEVICE — DISPOSABLE TOURNIQUET CUFF SINGLE BLADDER, SINGLE PORT AND QUICK CONNECT CONNECTOR: Brand: COLOR CUFF

## (undated) DEVICE — ZIMMER® STERILE DISPOSABLE TOURNIQUET CUFF WITH PLC, DUAL PORT, SINGLE BLADDER, 18 IN. (46 CM)

## (undated) DEVICE — GLOVE SURG SZ 8 L12IN THK75MIL DK GRN LTX FREE

## (undated) DEVICE — APPLICATOR MEDICATED 26 CC SOLUTION HI LT ORNG CHLORAPREP

## (undated) DEVICE — BNDG,ELSTC,MATRIX,STRL,4"X5YD,LF,HOOK&LP: Brand: MEDLINE